# Patient Record
Sex: MALE | Race: WHITE | NOT HISPANIC OR LATINO | Employment: OTHER | URBAN - METROPOLITAN AREA
[De-identification: names, ages, dates, MRNs, and addresses within clinical notes are randomized per-mention and may not be internally consistent; named-entity substitution may affect disease eponyms.]

---

## 2022-12-03 ENCOUNTER — HOSPITAL ENCOUNTER (EMERGENCY)
Facility: CLINIC | Age: 67
Discharge: HOME | End: 2022-12-04
Attending: EMERGENCY MEDICINE | Admitting: EMERGENCY MEDICINE
Payer: MEDICARE

## 2022-12-03 ENCOUNTER — APPOINTMENT (OUTPATIENT)
Dept: CT IMAGING | Facility: CLINIC | Age: 67
End: 2022-12-03
Attending: FAMILY MEDICINE
Payer: MEDICARE

## 2022-12-03 ENCOUNTER — APPOINTMENT (OUTPATIENT)
Dept: GENERAL RADIOLOGY | Facility: CLINIC | Age: 67
End: 2022-12-03
Attending: EMERGENCY MEDICINE
Payer: MEDICARE

## 2022-12-03 DIAGNOSIS — J18.9 COMMUNITY ACQUIRED PNEUMONIA, UNSPECIFIED LATERALITY: ICD-10-CM

## 2022-12-03 DIAGNOSIS — J96.01 ACUTE RESPIRATORY FAILURE WITH HYPOXIA (H): ICD-10-CM

## 2022-12-03 LAB
ANION GAP SERPL CALCULATED.3IONS-SCNC: 9 MMOL/L (ref 7–15)
BASOPHILS # BLD MANUAL: 0 10E3/UL (ref 0–0.2)
BASOPHILS NFR BLD MANUAL: 0 %
BUN SERPL-MCNC: 15 MG/DL (ref 8–23)
CALCIUM SERPL-MCNC: 8.6 MG/DL (ref 8.8–10.2)
CHLORIDE SERPL-SCNC: 101 MMOL/L (ref 98–107)
CREAT SERPL-MCNC: 0.71 MG/DL (ref 0.67–1.17)
DEPRECATED HCO3 PLAS-SCNC: 30 MMOL/L (ref 22–29)
EOSINOPHIL # BLD MANUAL: 0 10E3/UL (ref 0–0.7)
EOSINOPHIL NFR BLD MANUAL: 0 %
ERYTHROCYTE [DISTWIDTH] IN BLOOD BY AUTOMATED COUNT: 12.9 % (ref 10–15)
FLUAV RNA SPEC QL NAA+PROBE: NEGATIVE
FLUBV RNA RESP QL NAA+PROBE: NEGATIVE
GFR SERPL CREATININE-BSD FRML MDRD: >90 ML/MIN/1.73M2
GLUCOSE SERPL-MCNC: 115 MG/DL (ref 70–99)
HCT VFR BLD AUTO: 40.4 % (ref 40–53)
HGB BLD-MCNC: 13.6 G/DL (ref 13.3–17.7)
LYMPHOCYTES # BLD MANUAL: 1.8 10E3/UL (ref 0.8–5.3)
LYMPHOCYTES NFR BLD MANUAL: 34 %
MCH RBC QN AUTO: 28 PG (ref 26.5–33)
MCHC RBC AUTO-ENTMCNC: 33.7 G/DL (ref 31.5–36.5)
MCV RBC AUTO: 83 FL (ref 78–100)
MONOCYTES # BLD MANUAL: 0.7 10E3/UL (ref 0–1.3)
MONOCYTES NFR BLD MANUAL: 14 %
NEUTROPHILS # BLD MANUAL: 2.7 10E3/UL (ref 1.6–8.3)
NEUTROPHILS NFR BLD MANUAL: 52 %
PLAT MORPH BLD: NORMAL
PLATELET # BLD AUTO: 170 10E3/UL (ref 150–450)
POTASSIUM SERPL-SCNC: 3.5 MMOL/L (ref 3.4–5.3)
RBC # BLD AUTO: 4.85 10E6/UL (ref 4.4–5.9)
RBC MORPH BLD: NORMAL
SARS-COV-2 RNA RESP QL NAA+PROBE: NEGATIVE
SODIUM SERPL-SCNC: 140 MMOL/L (ref 136–145)
WBC # BLD AUTO: 5.2 10E3/UL (ref 4–11)

## 2022-12-03 PROCEDURE — 99291 CRITICAL CARE FIRST HOUR: CPT | Mod: CS,25

## 2022-12-03 PROCEDURE — 36415 COLL VENOUS BLD VENIPUNCTURE: CPT | Performed by: EMERGENCY MEDICINE

## 2022-12-03 PROCEDURE — 250N000011 HC RX IP 250 OP 636: Performed by: FAMILY MEDICINE

## 2022-12-03 PROCEDURE — 94640 AIRWAY INHALATION TREATMENT: CPT

## 2022-12-03 PROCEDURE — 99291 CRITICAL CARE FIRST HOUR: CPT | Mod: 25 | Performed by: EMERGENCY MEDICINE

## 2022-12-03 PROCEDURE — 85007 BL SMEAR W/DIFF WBC COUNT: CPT | Performed by: EMERGENCY MEDICINE

## 2022-12-03 PROCEDURE — 71046 X-RAY EXAM CHEST 2 VIEWS: CPT

## 2022-12-03 PROCEDURE — 85027 COMPLETE CBC AUTOMATED: CPT | Performed by: EMERGENCY MEDICINE

## 2022-12-03 PROCEDURE — 250N000011 HC RX IP 250 OP 636: Performed by: EMERGENCY MEDICINE

## 2022-12-03 PROCEDURE — 87636 SARSCOV2 & INF A&B AMP PRB: CPT | Performed by: EMERGENCY MEDICINE

## 2022-12-03 PROCEDURE — G1010 CDSM STANSON: HCPCS

## 2022-12-03 PROCEDURE — 96365 THER/PROPH/DIAG IV INF INIT: CPT

## 2022-12-03 PROCEDURE — 250N000012 HC RX MED GY IP 250 OP 636 PS 637: Performed by: FAMILY MEDICINE

## 2022-12-03 PROCEDURE — 250N000009 HC RX 250: Performed by: FAMILY MEDICINE

## 2022-12-03 PROCEDURE — 96366 THER/PROPH/DIAG IV INF ADDON: CPT

## 2022-12-03 PROCEDURE — 250N000009 HC RX 250: Performed by: EMERGENCY MEDICINE

## 2022-12-03 PROCEDURE — 82310 ASSAY OF CALCIUM: CPT | Performed by: EMERGENCY MEDICINE

## 2022-12-03 PROCEDURE — C9803 HOPD COVID-19 SPEC COLLECT: HCPCS

## 2022-12-03 RX ORDER — IPRATROPIUM BROMIDE AND ALBUTEROL SULFATE 2.5; .5 MG/3ML; MG/3ML
3 SOLUTION RESPIRATORY (INHALATION) ONCE
Status: COMPLETED | OUTPATIENT
Start: 2022-12-03 | End: 2022-12-03

## 2022-12-03 RX ORDER — PREDNISONE 20 MG/1
40 TABLET ORAL ONCE
Status: COMPLETED | OUTPATIENT
Start: 2022-12-03 | End: 2022-12-03

## 2022-12-03 RX ORDER — PREDNISONE 20 MG/1
40 TABLET ORAL DAILY
Status: DISCONTINUED | OUTPATIENT
Start: 2022-12-04 | End: 2022-12-04 | Stop reason: HOSPADM

## 2022-12-03 RX ORDER — CEFTRIAXONE 1 G/1
1 INJECTION, POWDER, FOR SOLUTION INTRAMUSCULAR; INTRAVENOUS ONCE
Status: COMPLETED | OUTPATIENT
Start: 2022-12-03 | End: 2022-12-03

## 2022-12-03 RX ORDER — DOXYCYCLINE 100 MG/1
100 CAPSULE ORAL 2 TIMES DAILY
Qty: 14 CAPSULE | Refills: 0 | Status: SHIPPED | OUTPATIENT
Start: 2022-12-03 | End: 2022-12-04

## 2022-12-03 RX ORDER — IOPAMIDOL 755 MG/ML
69 INJECTION, SOLUTION INTRAVASCULAR ONCE
Status: COMPLETED | OUTPATIENT
Start: 2022-12-03 | End: 2022-12-03

## 2022-12-03 RX ADMIN — CEFTRIAXONE SODIUM 1 G: 1 INJECTION, POWDER, FOR SOLUTION INTRAMUSCULAR; INTRAVENOUS at 17:50

## 2022-12-03 RX ADMIN — IPRATROPIUM BROMIDE AND ALBUTEROL SULFATE 3 ML: 2.5; .5 SOLUTION RESPIRATORY (INHALATION) at 16:52

## 2022-12-03 RX ADMIN — PREDNISONE 40 MG: 20 TABLET ORAL at 21:47

## 2022-12-03 RX ADMIN — IOPAMIDOL 69 ML: 755 INJECTION, SOLUTION INTRAVENOUS at 21:05

## 2022-12-03 RX ADMIN — SODIUM CHLORIDE 100 ML: 9 INJECTION, SOLUTION INTRAVENOUS at 21:05

## 2022-12-03 ASSESSMENT — ACTIVITIES OF DAILY LIVING (ADL)
ADLS_ACUITY_SCORE: 35
ADLS_ACUITY_SCORE: 35
ADLS_ACUITY_SCORE: 33
ADLS_ACUITY_SCORE: 35
ADLS_ACUITY_SCORE: 33

## 2022-12-03 NOTE — ED TRIAGE NOTES
Pt here with cough and SOB that started on Sunday. Pt is able to speak in full sentences, but has audible gurgling. States he was at the clinic yesterday where they did a chest xray that could not be read, tried to send him home with a nebulizer- but they said pneumonia wasn't a diagnosis that would cover the machine and his O2 was low and they tried to get him home oxygen, but everything was closed. Covid, flu and RSV were negative.  Was started on an antibiotic that he took last night and this AM.      Triage Assessment       Row Name 12/03/22 7004       Triage Assessment (Adult)    Airway WDL WDL       Respiratory WDL    Respiratory WDL X;all    Rhythm/Pattern, Respiratory shortness of breath    Cough Frequency infrequent

## 2022-12-03 NOTE — ED NOTES
Patient reports feeling unwell for 6 days, negative home covid test 5 days ago, which was negative. Was seen in a private clinic yesterday. Flu, covid and RSV were negative and he reports they were unable to read the xray. Patient states he was started on abx for pneumonia. He was also prescribed a nebulizer as well but it was not approved my insurance.

## 2022-12-03 NOTE — DISCHARGE INSTRUCTIONS
I agree with your diagnosis yesterday of community acquired pneumonia. Continue to take Augmentin as prescribed. I would also like you to start taking azithromycin.     If your symptoms worsen or you develop new or concerning symptoms, you should return to the Emergency Department. Otherwise, follow up with your primary care provider this next week.     I hope you feel better soon.

## 2022-12-04 VITALS
BODY MASS INDEX: 27.47 KG/M2 | SYSTOLIC BLOOD PRESSURE: 149 MMHG | DIASTOLIC BLOOD PRESSURE: 84 MMHG | RESPIRATION RATE: 22 BRPM | TEMPERATURE: 98.2 F | OXYGEN SATURATION: 93 % | WEIGHT: 175 LBS | HEIGHT: 67 IN | HEART RATE: 73 BPM

## 2022-12-04 PROBLEM — J18.9 COMMUNITY ACQUIRED PNEUMONIA, UNSPECIFIED LATERALITY: Status: ACTIVE | Noted: 2022-12-04

## 2022-12-04 PROBLEM — J96.01 ACUTE RESPIRATORY FAILURE WITH HYPOXIA (H): Status: ACTIVE | Noted: 2022-12-04

## 2022-12-04 PROCEDURE — 94618 PULMONARY STRESS TESTING: CPT

## 2022-12-04 PROCEDURE — 120N000001 HC R&B MED SURG/OB

## 2022-12-04 PROCEDURE — 999N000123 HC STATISTIC OXYGEN O2DAILY TECH TIME

## 2022-12-04 PROCEDURE — 999N000157 HC STATISTIC RCP TIME EA 10 MIN

## 2022-12-04 RX ORDER — AZITHROMYCIN 250 MG/1
TABLET, FILM COATED ORAL
Qty: 6 TABLET | Refills: 0 | Status: SHIPPED | OUTPATIENT
Start: 2022-12-04 | End: 2022-12-09

## 2022-12-04 RX ORDER — PREDNISONE 20 MG/1
TABLET ORAL
Qty: 10 TABLET | Refills: 0 | Status: SHIPPED | OUTPATIENT
Start: 2022-12-04

## 2022-12-04 ASSESSMENT — ACTIVITIES OF DAILY LIVING (ADL)
ADLS_ACUITY_SCORE: 35

## 2022-12-04 NOTE — ED NOTES
RT in to assess pt for home O2.  Pt not sleeping and is a good candidate to go home with home o2.    PLAN:   will await delivery and disposition

## 2022-12-04 NOTE — ED NOTES
Oxygen Documentation:   I certify that this patient, Dre Mirza has been under my care (or a nurse practitioner or physician's assistant working with me). This is the face-to-face encounter for oxygen medical necessity.      Dre Mirza is now in a chronic stable state and continues to require supplemental oxygen. Patient has continued oxygen desaturation due to COPD J44.9  Pneumonia.    Alternative treatment(s) tried or considered and deemed clinically infective for treatment of COPD J44.9  pneumonia include nebulizers, steroids, and pulmonary toileting.  If portability is ordered, is the patient mobile within the home? yes    **Patients who qualify for home O2 coverage under the CMS guidelines require ABG tests or O2 sat readings obtained closest to, but no earlier than 2 days prior to the discharge, as evidence of the need for home oxygen therapy. Testing must be performed while patient is in the chronic stable state. See notes for O2 sats.**        MD Mikey Frazier David James, MD  12/04/22 0229

## 2022-12-04 NOTE — ED NOTES
"[facility]   Admission Handoff    The patient is Dre Mirza, 67 year old who arrived in the ED by CAR from home with a complaint of Shortness of Breath  . The patient's current symptoms are new and during this time the symptoms have increased. In the ED, patient was diagnosed with   Final diagnoses:   Community acquired pneumonia, unspecified laterality   Acute respiratory failure with hypoxia (H)         Needed?: No    Allergies:  No Known Allergies    Past Medical Hx: No past medical history on file.    Initial vitals were: BP: (!) 160/90  Pulse: 85  Temp: 98.4  F (36.9  C)  Resp: 22  Height: 168.9 cm (5' 6.5\")  Weight: 79.4 kg (175 lb)  SpO2: 93 %   Recent vital Signs: BP (!) 143/63   Pulse 83   Temp 98.4  F (36.9  C) (Tympanic)   Resp 22   Ht 1.689 m (5' 6.5\")   Wt 79.4 kg (175 lb)   SpO2 92%   BMI 27.82 kg/m      Elimination Status: Continent: No     Activity Level: Independent    Fall Status: Reason for falls risk:  Mobility  nonskid shoes/slippers when out of bed    Baseline Mental status: WDL  Current Mental Status changes: at basesline    Infection present or suspected this encounter: no  Sepsis suspected: No    Isolation type: None    Bariatric equipment needed?: No    In the ED these meds were given:   Medications   ipratropium - albuterol 0.5 mg/2.5 mg/3 mL (DUONEB) neb solution 3 mL (3 mLs Nebulization Given 12/3/22 1652)   cefTRIAXone (ROCEPHIN) 1 g vial to attach to  mL bag for ADULTS or NS 50 mL bag for PEDS (0 g Intravenous Stopped 12/3/22 2036)   predniSONE (DELTASONE) tablet 40 mg (40 mg Oral Given 12/3/22 2147)   iopamidol (ISOVUE-370) solution 69 mL (69 mLs Intravenous Given 12/3/22 2105)   sodium chloride 0.9 % bag 500mL for CT scan flush use (100 mLs Intravenous Given 12/3/22 2105)       Drips running?  No    Home pump  No    Current LDAs: Peripheral IV: Site LFA; Gauge 18  IV push only, no IV fluids     Results:   Labs/Imaging  Ordered " and Resulted from Time of ED Arrival Up to the Time of Departure from the ED  Results for orders placed or performed during the hospital encounter of 12/03/22 (from the past 24 hour(s))   Symptomatic; Yes; 11/27/2022 Influenza A/B & SARS-CoV2 (COVID-19) Virus PCR Multiplex Nose    Specimen: Nose; Swab   Result Value Ref Range    Influenza A PCR Negative Negative    Influenza B PCR Negative Negative    SARS CoV2 PCR Negative Negative    Narrative    Testing was performed using the segun SARS-CoV-2 & Influenza A/B Assay on the segun Deborah System. This test should be ordered for the detection of SARS-CoV-2 and influenza viruses in individuals who meet clinical and/or epidemiological criteria. Test performance is unknown in asymptomatic patients. This test is for in vitro diagnostic use under the FDA EUA for laboratories certified under CLIA to perform moderate and/or high complexity testing. This test has not been FDA cleared or approved. A negative result does not rule out the presence of PCR inhibitors in the specimen or target RNA in concentration below the limit of detection for the assay. If only one viral target is positive but coinfection with multiple targets is suspected, the sample should be re-tested with another FDA cleared, approved or authorized test, if coinfection would change clinical management. [facility] are certified under the Clinical Laboratory Improvement Amendments of 1988 (CLIA-88) as qualified to perform moderate and/or high complexity laboratory testing.   CBC with platelets differential    Narrative    The following orders were created for panel order CBC with platelets differential.  Procedure                               Abnormality         Status                     ---------                               -----------         ------                     CBC with platelets and d...[[id number]]  Normal              Final result               Manual Differential[[id number]]                               Final result                 Please view results for these tests on the individual orders.   Basic metabolic panel   Result Value Ref Range    Sodium 140 136 - 145 mmol/L    Potassium 3.5 3.4 - 5.3 mmol/L    Chloride 101 98 - 107 mmol/L    Carbon Dioxide (CO2) 30 (H) 22 - 29 mmol/L    Anion Gap 9 7 - 15 mmol/L    Urea Nitrogen 15.0 8.0 - 23.0 mg/dL    Creatinine 0.71 0.67 - 1.17 mg/dL    Calcium 8.6 (L) 8.8 - 10.2 mg/dL    Glucose 115 (H) 70 - 99 mg/dL    GFR Estimate >90 >60 mL/min/1.73m2   CBC with platelets and differential   Result Value Ref Range    WBC Count 5.2 4.0 - 11.0 10e3/uL    RBC Count 4.85 4.40 - 5.90 10e6/uL    Hemoglobin 13.6 13.3 - 17.7 g/dL    Hematocrit 40.4 40.0 - 53.0 %    MCV 83 78 - 100 fL    MCH 28.0 26.5 - 33.0 pg    MCHC 33.7 31.5 - 36.5 g/dL    RDW 12.9 10.0 - 15.0 %    Platelet Count 170 150 - 450 10e3/uL   Manual Differential   Result Value Ref Range    % Neutrophils 52 %    % Lymphocytes 34 %    % Monocytes 14 %    % Eosinophils 0 %    % Basophils 0 %    Absolute Neutrophils 2.7 1.6 - 8.3 10e3/uL    Absolute Lymphocytes 1.8 0.8 - 5.3 10e3/uL    Absolute Monocytes 0.7 0.0 - 1.3 10e3/uL    Absolute Eosinophils 0.0 0.0 - 0.7 10e3/uL    Absolute Basophils 0.0 0.0 - 0.2 10e3/uL    RBC Morphology Confirmed RBC Indices     Platelet Assessment  Automated Count Confirmed. Platelet morphology is normal.     Automated Count Confirmed. Platelet morphology is normal.   XR Chest 2 Views    Narrative    EXAM: XR CHEST 2 VIEWS  LOCATION: [facility]  DATE/TIME: 12/3/2022 6:35 PM    INDICATION: cough, hypoxia, bibasilar rales. no hx of CHF. likely infectious process  COMPARISON: None.      Impression    IMPRESSION: Negative chest.   CT Chest Pulmonary Embolism w Contrast    Narrative    EXAM: CT CHEST PULMONARY EMBOLISM W CONTRAST  LOCATION: [facility]  DATE/TIME: 12/3/2022 9:21 PM    INDICATION: Cough, congestion, neg  XR, hypoxemia  COMPARISON: None.  TECHNIQUE: CT chest pulmonary angiogram during arterial phase injection of IV contrast. Multiplanar reformats and MIP reconstructions were performed. Dose reduction techniques were used.   CONTRAST: 69 mL Isovue 370    FINDINGS:  ANGIOGRAM CHEST: Pulmonary arteries are normal caliber and negative for pulmonary emboli. Thoracic aorta is negative for dissection. No CT evidence of right heart strain.    LUNGS AND PLEURA: Very mild groundglass nodular infiltrate within the left lower lobe compatible with localized inflammation.    MEDIASTINUM/AXILLAE: Normal.    CORONARY ARTERY CALCIFICATION: Mild to moderate    UPPER ABDOMEN: Hepatic cyst.    MUSCULOSKELETAL: Normal.      Impression    IMPRESSION:  1.  No pulmonary embolism.    2.  Mild groundglass nodular infiltrate within the left lower lobe compatible with inflammation.    3.  Mild/moderate coronary artery calcification.       For the majority of the shift this patient's behavior was Green     Cardiac Rhythm: Normal Sinus  Pt needs tele? No  Skin/wound Issues: None    Code Status: Full Code    Pain control: pt had none    Nausea control: pt had none    Abnormal labs/tests/findings requiring intervention: None    Patient tested for COVID 19 prior to admission: YES     OBS brochure/video discussed/provided to patient/family: Yes     Family present during ED course? Yes     Family Comments/Social Situation comments: Family went home    Tasks needing completion: None    Michaela Orellana, RN

## 2022-12-04 NOTE — ED PROVIDER NOTES
Emergency Department Patient Sign-out     Total time in the ED:   05:52 hrs    Primary HPI, EXAM, and MDM:  Dre Mirza is a 67 year old male with no reported past medical history with the, tobacco user, with cough, subjective fever and chills, shortness of breath for past 6 days.  He was seen in an outside clinic yesterday and reports that he was diagnosed with pneumonia and had likely left lower lobe infiltrate and prescribed Augmentin for which he is taken a dose last evening as well as this morning.  He was also given an order for home oxygen from the clinic.  He was unable to get oxygen set up at home.  He does have a home oximeter and states that his oxygen saturations will drop into the mid to high 80s.  They also placed him order for a nebulizer machine and solution.  This was not covered by his insurance and he declined to pay for this out-of-pocket.  He said he was given a nebulizer in the clinic yesterday and did not report any significant improvement afterwards.  No documented COPD.  No history of asthma.  Patient became concerned because he continues to feel poorly, his oxygen readings have been low, unable to get home O2.  Also has an order for CT scan of his chest to be completed as an outpatient.  Patient's wife is present at bedside and supportive.    GEN: Awake, alert, and cooperative.  Appears distressed but nontoxic  HENT: MMM. External ears and nose normal bilaterally.  EYES: EOM intact. Conjunctiva clear. No discharge.   NECK: Symmetric, freely mobile.   CV : Regular rate and rhythm.  No murmurs appreciated  PULM: Increased work of breathing and increased respiratory rate.  Subtle prolongation of expiratory phase and lightly coarse breath sounds particular in bases bilaterally.  No accessory muscle usage.  No tactile fremitus  ABD: Soft, non-tender, non-distended. No rebound or guarding.   NEURO: Normal speech. Following commands. CN II-XII grossly intact. Answering questions and  interacting appropriately.   EXT: No gross deformity. Warm and well perfused.  No pitting pedal or pretibial edema  INT: Warm. No diaphoresis. Normal color.     67 year old male with past medical history of tobacco use with 6 days of cough with subjective fevers and chills now with hypoxia.  Was started on Augmentin yesterday (2 doses).  Clinical picture consistent with pneumonia.  He has hypoxia, subjective fevers and chills, cough, coarse bibasilar lung sounds.  My initial thoughts were to treat him as an outpatient and add doxycycline to his course.  He reports having an x-ray with concern for pneumonia and left base of lung.  While I was in the examination room his oxygen saturations were 90 to 92%.  He did report to me that he had lower oxygen saturations prior to me coming into the room.      While planning his discharge plan, I was notified by his nurse that he was having sustained hypoxia with readings of 87-88 percent on room air.  I did observe this on the monitor and reevaluated, he was having sustained hypoxia.   COVID influenza testing was negative.  CBC was normal.  Basic metabolic panel grossly normal, elevated bicarbonate and glucose of 116.  Chest x-ray without acute infiltrate or effusion on radiology read.  Possible streaking in left base on my interpretation.  However pneumonia remains a clinical diagnosis and given his presentation I think treating as inpatient would be appropriate.  He was given a dose of ceftriaxone while in the emergency department    ED Course MDM:  2029.  Patient signed out to me by Dr. Aguilar.  Patient is pneumonia clinically.  O2 saturation 87-88% on room air.  Patient is within normal limits now while on 3 L/min by nasal cannula.  No formal history of COPD.  Patient has not received steroid medication.    2106.  CT scan ordered to look for PE.  Prednisone 40 mg oral dose given.    2341.  CT scan shows inflammatory change but no clot or other acute pathology.  COPD  underlying is certainly a possibility and so prednisone will be continued.    IMPRESSION:    ICD-10-CM    1. Community acquired pneumonia, unspecified laterality  J18.9       2. Acute respiratory failure with hypoxia (H)  J96.01              Travis Shaffer MD  12/03/22 2344

## 2022-12-04 NOTE — ED PROVIDER NOTES
ED signout note:    In short, patient had presented with cough, congestion, with oxygen saturations which have been borderline on room air, stated to be 88 to 89%.  Patient with infectious symptoms, and therefore pneumonia thought to be likely, and patient given Rocephin.  Plan was for discharge home, however patient hypoxic requiring supplemental oxygen.    Patient is ambulatory, and just requires small amounts of oxygen.  RT was consulted, and patient does not require oxygen at rest based on resting oxygen saturations performed at approximately 2:40 AM.  He does require 3 L nasal cannula oxygen with ambulation.    After discussion with patient, he feels comfortable with discharge home if able to obtain home oxygen order.  I have requested home oxygen therapy for ambulation.  He will be prescribed steroids, and antibiotics, and return instructions discussed if worsening symptoms.    1. Community acquired pneumonia, unspecified laterality    2. Acute respiratory failure with hypoxia (H)      MD Mikey Frazier David James, MD  12/04/22 3076

## 2022-12-04 NOTE — ED NOTES
Pt up in the chair eating. 87% on 2L when he returned from xray. O2 increased to 4L to obtain sat's of 92%.

## 2022-12-04 NOTE — ED PROVIDER NOTES
History     Chief Complaint   Patient presents with     Shortness of Breath     hospitals  Dre Mirza is a 67 year old male with no reported past medical history with the, tobacco user, with cough, subjective fever and chills, shortness of breath for past 6 days.  He was seen in an outside clinic yesterday and reports that he was diagnosed with pneumonia and had likely left lower lobe infiltrate and prescribed Augmentin for which he is taken a dose last evening as well as this morning.  He was also given an order for home oxygen from the clinic.  He was unable to get oxygen set up at home.  He does have a home oximeter and states that his oxygen saturations will drop into the mid to high 80s.  They also placed him order for a nebulizer machine and solution.  This was not covered by his insurance and he declined to pay for this out-of-pocket.  He said he was given a nebulizer in the clinic yesterday and did not report any significant improvement afterwards.  No documented COPD.  No history of asthma.  Patient became concerned because he continues to feel poorly, his oxygen readings have been low, unable to get home O2.  Also has an order for CT scan of his chest to be completed as an outpatient.  Patient's wife is present at bedside and supportive.    The patient's PMHx, Surgical Hx, Allergies, and Medications were all reviewed with the patient.    Allergies:  No Known Allergies    Problem List:    There are no problems to display for this patient.       Past Medical History:    No past medical history on file.    Past Surgical History:    No past surgical history on file.    Family History:    No family history on file.    Social History:  Marital Status:   [2]        Medications:    doxycycline hyclate (VIBRAMYCIN) 100 MG capsule          Review of Systems  A complete review of systems performed and is otherwise negative except as noted in the HPI    Physical Exam   BP: (!) 160/90  Pulse: 85  Temp: 98.4  F  "(36.9  C)  Resp: 22  Height: 168.9 cm (5' 6.5\")  Weight: 79.4 kg (175 lb)  SpO2: 93 %    Physical Exam  GEN: Awake, alert, and cooperative.  Appears distressed but nontoxic  HENT: MMM. External ears and nose normal bilaterally.  EYES: EOM intact. Conjunctiva clear. No discharge.   NECK: Symmetric, freely mobile.   CV : Regular rate and rhythm.  No murmurs appreciated  PULM: Increased work of breathing and increased respiratory rate.  Subtle prolongation of expiratory phase and lightly coarse breath sounds particular in bases bilaterally.  No accessory muscle usage.  No tactile fremitus  ABD: Soft, non-tender, non-distended. No rebound or guarding.   NEURO: Normal speech. Following commands. CN II-XII grossly intact. Answering questions and interacting appropriately.   EXT: No gross deformity. Warm and well perfused.  No pitting pedal or pretibial edema  INT: Warm. No diaphoresis. Normal color.        ED Course        Procedures         Critical Care time:  was 30 minutes for this patient excluding procedures.               Results for orders placed or performed during the hospital encounter of 12/03/22 (from the past 24 hour(s))   Symptomatic; Yes; 11/27/2022 Influenza A/B & SARS-CoV2 (COVID-19) Virus PCR Multiplex Nose    Specimen: Nose; Swab   Result Value Ref Range    Influenza A PCR Negative Negative    Influenza B PCR Negative Negative    SARS CoV2 PCR Negative Negative    Narrative    Testing was performed using the segun SARS-CoV-2 & Influenza A/B Assay on the segun [employer] System. This test should be ordered for the detection of SARS-CoV-2 and influenza viruses in individuals who meet clinical and/or epidemiological criteria. Test performance is unknown in asymptomatic patients. This test is for in vitro diagnostic use under the FDA EUA for laboratories certified under CLIA to perform moderate and/or high complexity testing. This test has not been FDA cleared or approved. A negative result does not rule out the " presence of PCR inhibitors in the specimen or target RNA in concentration below the limit of detection for the assay. If only one viral target is positive but coinfection with multiple targets is suspected, the sample should be re-tested with another FDA cleared, approved or authorized test, if coinfection would change clinical management. [facility] are certified under the Clinical Laboratory Improvement Amendments of 1988 (CLIA-88) as qualified to perform moderate and/or high complexity laboratory testing.   CBC with platelets differential    Narrative    The following orders were created for panel order CBC with platelets differential.  Procedure                               Abnormality         Status                     ---------                               -----------         ------                     CBC with platelets and d...[[id number]]  Normal              Final result               Manual Differential[[id number]]                              Final result                 Please view results for these tests on the individual orders.   Basic metabolic panel   Result Value Ref Range    Sodium 140 136 - 145 mmol/L    Potassium 3.5 3.4 - 5.3 mmol/L    Chloride 101 98 - 107 mmol/L    Carbon Dioxide (CO2) 30 (H) 22 - 29 mmol/L    Anion Gap 9 7 - 15 mmol/L    Urea Nitrogen 15.0 8.0 - 23.0 mg/dL    Creatinine 0.71 0.67 - 1.17 mg/dL    Calcium 8.6 (L) 8.8 - 10.2 mg/dL    Glucose 115 (H) 70 - 99 mg/dL    GFR Estimate >90 >60 mL/min/1.73m2   CBC with platelets and differential   Result Value Ref Range    WBC Count 5.2 4.0 - 11.0 10e3/uL    RBC Count 4.85 4.40 - 5.90 10e6/uL    Hemoglobin 13.6 13.3 - 17.7 g/dL    Hematocrit 40.4 40.0 - 53.0 %    MCV 83 78 - 100 fL    MCH 28.0 26.5 - 33.0 pg    MCHC 33.7 31.5 - 36.5 g/dL    RDW 12.9 10.0 - 15.0 %    Platelet Count 170 150 - 450 10e3/uL   Manual Differential   Result Value Ref Range    % Neutrophils 52 %    % Lymphocytes 34 %    % Monocytes 14 %     % Eosinophils 0 %    % Basophils 0 %    Absolute Neutrophils 2.7 1.6 - 8.3 10e3/uL    Absolute Lymphocytes 1.8 0.8 - 5.3 10e3/uL    Absolute Monocytes 0.7 0.0 - 1.3 10e3/uL    Absolute Eosinophils 0.0 0.0 - 0.7 10e3/uL    Absolute Basophils 0.0 0.0 - 0.2 10e3/uL    RBC Morphology Confirmed RBC Indices     Platelet Assessment  Automated Count Confirmed. Platelet morphology is normal.     Automated Count Confirmed. Platelet morphology is normal.   XR Chest 2 Views    Narrative    EXAM: XR CHEST 2 VIEWS  LOCATION: [facility]  DATE/TIME: 12/3/2022 6:35 PM    INDICATION: cough, hypoxia, bibasilar rales. no hx of CHF. likely infectious process  COMPARISON: None.      Impression    IMPRESSION: Negative chest.       Medications   ipratropium - albuterol 0.5 mg/2.5 mg/3 mL (DUONEB) neb solution 3 mL (3 mLs Nebulization Given 12/3/22 1652)   cefTRIAXone (ROCEPHIN) 1 g vial to attach to  mL bag for ADULTS or NS 50 mL bag for PEDS (1 g Intravenous New Bag 12/3/22 1750)       Assessments & Plan (with Medical Decision Making)   67 year old male with past medical history of tobacco use with 6 days of cough with subjective fevers and chills now with hypoxia.  Was started on Augmentin yesterday (2 doses).  Clinical picture consistent with pneumonia.  He has hypoxia, subjective fevers and chills, cough, coarse bibasilar lung sounds.  My initial thoughts were to treat him as an outpatient and add doxycycline to his course.  He reports having an x-ray with concern for pneumonia and left base of lung.  While I was in the examination room his oxygen saturations were 90 to 92%.  He did report to me that he had lower oxygen saturations prior to me coming into the room.     While planning his discharge plan, I was notified by his nurse that he was having sustained hypoxia with readings of 87-88 percent on room air.  I did observe this on the monitor and reevaluated, he was having sustained hypoxia.      COVID influenza testing was negative.  CBC was normal.  Basic metabolic panel grossly normal, elevated bicarbonate and glucose of 116.  Chest x-ray without acute infiltrate or effusion on radiology read.  Possible streaking in left base on my interpretation.  However pneumonia remains a clinical diagnosis and given his presentation I think treating as inpatient would be appropriate.  He was given a dose of ceftriaxone while in the emergency department.    No beds currently available at [facility]. It is the end of my shift and care of patient signed out to Dr. Alejandre for further cares while in the department.      I have reviewed the nursing notes.         New Prescriptions    DOXYCYCLINE HYCLATE (VIBRAMYCIN) 100 MG CAPSULE    Take 1 capsule (100 mg) by mouth 2 times daily for 7 days       Final diagnoses:   Community acquired pneumonia, unspecified laterality   Acute respiratory failure with hypoxia (H)     Cedric Aguilar MD    12/3/2022   [facility] EMERGENCY DEPT    Disclaimer: This note consists of words and symbols derived from keyboarding and dictation using voice recognition software.  As a result, there may be errors that have gone undetected.  Please consider this when interpreting information found in this note.             Cedric Aguilar MD  12/03/22 2051

## 2022-12-04 NOTE — ED NOTES
Pt remains awake and is in and out of bed, sitting on chair or standing at bedside.  Pt sats he doesn't sleep well for years.   Offered recliner and pt declines at this time.  Lights off and repositioning offered.  VSS.  Encourage at least to stay in bed and try and rest.  PLAN:  Will continue to monitor.